# Patient Record
Sex: FEMALE | Race: OTHER | HISPANIC OR LATINO | URBAN - METROPOLITAN AREA
[De-identification: names, ages, dates, MRNs, and addresses within clinical notes are randomized per-mention and may not be internally consistent; named-entity substitution may affect disease eponyms.]

---

## 2019-01-17 ENCOUNTER — EMERGENCY (EMERGENCY)
Facility: HOSPITAL | Age: 29
LOS: 0 days | Discharge: HOME | End: 2019-01-17
Attending: EMERGENCY MEDICINE | Admitting: EMERGENCY MEDICINE

## 2019-01-17 VITALS
SYSTOLIC BLOOD PRESSURE: 137 MMHG | TEMPERATURE: 98 F | DIASTOLIC BLOOD PRESSURE: 73 MMHG | OXYGEN SATURATION: 98 % | HEIGHT: 61 IN | RESPIRATION RATE: 20 BRPM | HEART RATE: 90 BPM | WEIGHT: 125 LBS

## 2019-01-17 DIAGNOSIS — R07.89 OTHER CHEST PAIN: ICD-10-CM

## 2019-01-17 DIAGNOSIS — R07.9 CHEST PAIN, UNSPECIFIED: ICD-10-CM

## 2019-01-17 RX ORDER — IBUPROFEN 200 MG
600 TABLET ORAL ONCE
Qty: 0 | Refills: 0 | Status: COMPLETED | OUTPATIENT
Start: 2019-01-17 | End: 2019-01-17

## 2019-01-17 RX ADMIN — Medication 600 MILLIGRAM(S): at 01:03

## 2019-01-17 NOTE — ED PROVIDER NOTE - OBJECTIVE STATEMENT
pt with anterior cp for 2-3 days, intermittently, but today constant for about 12 hrs  worse when yelling at her children she sts  has not tried anything for pain  similar sxs 1 year ago, seen at ER in [place] and told "not a heart attack", never had cardio f/u  denies recent travel or sick contacts  +CAD in GF late in life, no other cardiac fam hx or sudden death  denies smoking, etoh, drug use  Denies fever/chill/HA/dizziness/palpitation/sob/abd pain/n/v/d/ black stool/bloody stool/urinary sxs/leg pain or swelling

## 2019-01-17 NOTE — ED PROVIDER NOTE - PROGRESS NOTE DETAILS
plan for cxr, NSAID, reassess; hx/exam c/w MS source and d/w pt, she agrees as was told same thing 1 year ago; if wnl, plan for d/c home with er precautions and cardio f/u, pt understands and agrees Pt aware that we will have official radiology read pending, and may result in change of xray read.  Pt voices understanding of use of medications, instructions for care, and reasons to return or to go to ED

## 2019-01-17 NOTE — ED PROVIDER NOTE - PHYSICAL EXAMINATION
CONSTITUTIONAL: Well-appearing; well-nourished; in no apparent distress.   NECK: Supple; non-tender; no cervical lymphadenopathy. No JVD.   CARDIOVASCULAR: Normal S1, S2; no murmurs, rubs, or gallops.   RESPIRATORY: Normal chest excursion with respiration; breath sounds clear and equal bilaterally; no wheezes, rhonchi, or rales.  GI/: Normal bowel sounds; non-distended; non-tender; no palpable organomegaly.   SKIN: Normal for age and race; warm; dry; good turgor; no apparent lesions or exudate.   NEURO/PSYCH: A & O x 4; grossly unremarkable. mood and manner are appropriate. Grooming and personal hygiene are appropriate. No apparent thoughts of harm to self or others.

## 2019-01-17 NOTE — ED PROVIDER NOTE - ATTENDING CONTRIBUTION TO CARE
I personally evaluated the patient. I reviewed the Resident’s or Physician Assistant’s note (as assigned above), and agree with the findings and plan except as documented in my note.  28 yr F with intermittent mid chest pain. No SOB, no traum, no LE pain /swelling, coughing, recent travel/immobilization. On exam, non toxic, talking in fulls sentences. Normal s1s2, lungs ctab. Plan is EKG, CXR and reassess.

## 2019-01-17 NOTE — ED PROVIDER NOTE - CARE PROVIDER_API CALL
Naren Bautista), Cardiovascular Disease; Internal Medicine  [address] [place] [address]  Phone: [phone]  Fax: [phone]

## 2019-01-17 NOTE — ED PROVIDER NOTE - NS ED ROS FT
Constitutional: no fever, chills, no recent weight loss, change in appetite or malaise  Eyes: no redness/discharge/pain/vision changes  ENT: no rhinorrhea/ear pain/sore throat  Cardiac: No SOB or edema.  Respiratory: No cough or respiratory distress  GI: No nausea, vomiting, diarrhea or abdominal pain.  : No dysuria, frequency, urgency or hematuria  MS: no pain to back or extremities, no loss of ROM, no weakness  Neuro: No headache or weakness. No LOC.  Skin: No skin rash.  Endocrine: No history of thyroid disease or diabetes.  Except as documented in the HPI, all other systems are negative.

## 2019-03-16 ENCOUNTER — OUTPATIENT (OUTPATIENT)
Dept: OUTPATIENT SERVICES | Facility: HOSPITAL | Age: 29
LOS: 1 days | Discharge: HOME | End: 2019-03-16

## 2019-03-16 DIAGNOSIS — Z00.00 ENCOUNTER FOR GENERAL ADULT MEDICAL EXAMINATION WITHOUT ABNORMAL FINDINGS: ICD-10-CM

## 2019-11-25 ENCOUNTER — OUTPATIENT (OUTPATIENT)
Dept: OUTPATIENT SERVICES | Facility: HOSPITAL | Age: 29
LOS: 1 days | Discharge: HOME | End: 2019-11-25

## 2019-11-25 DIAGNOSIS — R10.12 LEFT UPPER QUADRANT PAIN: ICD-10-CM

## 2020-11-02 PROBLEM — Z00.00 ENCOUNTER FOR PREVENTIVE HEALTH EXAMINATION: Status: ACTIVE | Noted: 2020-11-02

## 2020-11-06 ENCOUNTER — APPOINTMENT (OUTPATIENT)
Dept: OBGYN | Facility: CLINIC | Age: 30
End: 2020-11-06
Payer: OTHER GOVERNMENT

## 2020-11-06 VITALS
DIASTOLIC BLOOD PRESSURE: 76 MMHG | HEIGHT: 61 IN | SYSTOLIC BLOOD PRESSURE: 111 MMHG | BODY MASS INDEX: 23.41 KG/M2 | WEIGHT: 124 LBS | HEART RATE: 74 BPM | TEMPERATURE: 97.7 F

## 2020-11-06 DIAGNOSIS — Z78.9 OTHER SPECIFIED HEALTH STATUS: ICD-10-CM

## 2020-11-06 DIAGNOSIS — Z80.9 FAMILY HISTORY OF MALIGNANT NEOPLASM, UNSPECIFIED: ICD-10-CM

## 2020-11-06 DIAGNOSIS — R10.9 UNSPECIFIED ABDOMINAL PAIN: ICD-10-CM

## 2020-11-06 DIAGNOSIS — N91.2 AMENORRHEA, UNSPECIFIED: ICD-10-CM

## 2020-11-06 DIAGNOSIS — R10.2 PELVIC AND PERINEAL PAIN: ICD-10-CM

## 2020-11-06 DIAGNOSIS — Z12.4 ENCOUNTER FOR SCREENING FOR MALIGNANT NEOPLASM OF CERVIX: ICD-10-CM

## 2020-11-06 DIAGNOSIS — Z01.419 ENCOUNTER FOR GYNECOLOGICAL EXAMINATION (GENERAL) (ROUTINE) W/OUT ABNORMAL FINDINGS: ICD-10-CM

## 2020-11-06 PROCEDURE — 99385 PREV VISIT NEW AGE 18-39: CPT

## 2020-11-06 PROCEDURE — 81025 URINE PREGNANCY TEST: CPT

## 2020-11-10 LAB
C TRACH RRNA SPEC QL NAA+PROBE: NOT DETECTED
HPV HIGH+LOW RISK DNA PNL CVX: NOT DETECTED
N GONORRHOEA RRNA SPEC QL NAA+PROBE: NOT DETECTED
SOURCE AMPLIFICATION: NORMAL

## 2020-11-11 LAB
SOURCE AMPLIFICATION: NORMAL
T VAGINALIS RRNA SPEC QL NAA+PROBE: NOT DETECTED

## 2020-11-14 PROBLEM — Z80.9 FAMILY HISTORY OF MALIGNANT NEOPLASM: Status: ACTIVE | Noted: 2020-11-14

## 2020-11-14 PROBLEM — R10.9 ABDOMINAL PAIN: Status: ACTIVE | Noted: 2020-11-14

## 2020-11-14 PROBLEM — Z78.9 EXERCISES OCCASIONALLY: Status: ACTIVE | Noted: 2020-11-14

## 2020-11-14 PROBLEM — Z78.9 CONSUMES ALCOHOL OCCASIONALLY: Status: ACTIVE | Noted: 2020-11-14

## 2020-11-14 PROBLEM — R10.2 PELVIC PAIN: Status: ACTIVE | Noted: 2020-11-14

## 2020-11-14 PROBLEM — N91.2 AMENORRHEA: Status: ACTIVE | Noted: 2020-11-14

## 2020-11-14 PROBLEM — Z12.4 ENCOUNTER FOR SCREENING FOR CERVICAL CANCER: Status: ACTIVE | Noted: 2020-11-06

## 2020-11-14 PROBLEM — Z78.9 DOES NOT USE ILLICIT DRUGS: Status: ACTIVE | Noted: 2020-11-14

## 2020-11-14 PROBLEM — Z78.9 DOES NOT USE TOBACCO: Status: ACTIVE | Noted: 2020-11-14

## 2020-11-14 RX ORDER — LEVONORGESTREL 52 MG/1
20 INTRAUTERINE DEVICE INTRAUTERINE
Refills: 0 | Status: ACTIVE | COMMUNITY

## 2020-11-14 RX ORDER — MULTIVITAMIN
TABLET ORAL
Refills: 0 | Status: ACTIVE | COMMUNITY

## 2020-11-18 NOTE — PHYSICAL EXAM
[Appropriately responsive] : appropriately responsive [Alert] : alert [No Acute Distress] : no acute distress [No Lymphadenopathy] : no lymphadenopathy [Soft] : soft [Non-tender] : non-tender [Non-distended] : non-distended [No Mass] : no mass [Oriented x3] : oriented x3 [Examination Of The Breasts] : a normal appearance [No Discharge] : no discharge [No Masses] : no breast masses were palpable [Labia Majora] : normal [Labia Minora] : normal [No Bleeding] : There was no active vaginal bleeding [IUD String] : an IUD string was noted [Normal] : normal [Tenderness] : nontender [Enlarged ___ wks] : not enlarged [Mass ___ cm] : no uterine mass was palpated [Uterine Adnexae] : normal [FreeTextEntry5] : pap smear done [FreeTextEntry6] : no reproducible pain on bimanual exam

## 2020-11-18 NOTE — HISTORY OF PRESENT ILLNESS
[Patient reported PAP Smear was normal] : Patient reported PAP Smear was normal [IUD] : has an intrauterine device [Y] : Positive pregnancy history [Menarche Age: ____] : age at menarche was [unfilled] [Currently Active] : currently active [Men] : men [No] : No [Gonorrhea test offered] : Gonorrhea test offered [Chlamydia test offered] : Chlamydia test offered [Trichomonas test offered] : Trichomonas test offered [HPV test offered] : HPV test offered [FreeTextEntry1] : Pt is here for an initial annual exam, pt has c/o of occasional pelvic pain that is associated with pain in abdomen also. Pt would also like to discuss having a salpingectomy, pt has 2 kids and IUD placed but states that the IUD can be felt during intercourse.  [TextBox_4] : 29yo  with LMP 4yrs ago after Mirena IUD inserted (had some spotting 2/2020), she came for annual GYN Exam and pap smear.  She also c/o occasional pelvic and abdominal pain that has become more frequent.  It is associated with nausea and is worsened with food and alleviated with water intake.  Patient points mostly to mid abdomen.  She also would like to discuss permanent sterilization because she is not satisfied with the Mirena long term mgmt and plans to change mgmt when IUD is removed. [PapSmeardate] : 07/2019 [de-identified] : Mirena [PGHxTotal] : 2 [[facility] : 2 [PGHxPremature] : 0 [PGHxAbortions] : 0 [[facility]Living] : 2 [FreeTextEntry3] : IUD

## 2020-11-18 NOTE — REVIEW OF SYSTEMS
[Negative] : Heme/Lymph [Abdominal Pain] : abdominal pain [Nausea] : nausea [Pelvic pain] : pelvic pain [FreeTextEntry7] : see HPI [FreeTextEntry8] : see HPI

## 2020-11-18 NOTE — COUNSELING
[Nutrition/ Exercise/ Weight Management] : nutrition, exercise, weight management [Vitamins/Supplements] : vitamins/supplements [Breast Self Exam] : breast self exam [Contraception/ Emergency Contraception/ Safe Sexual Practices] : contraception, emergency contraception, safe sexual practices [STD (testing, results, tx)] : STD (testing, results, tx) [Medication Management] : medication management

## 2020-11-18 NOTE — DISCUSSION/SUMMARY
[FreeTextEntry1] : A: 31yo for annual GYN Exam, abd/pelvic pain-intermittent, contraception mgmt\par \par P: GYN Exam done\par     pap smear done\par     safe sex practices\par     contraception counseling done - will keep IUD for now (in place), but signed informed consent for permanent sterilization and will return after 30 days waiting period if wants to continue with mgmt\par     referral for abd/pelvis sono\par     pain and bleeding precautions\par     f/u after imaging or PRN

## 2020-12-23 PROBLEM — Z01.419 ENCOUNTER FOR ANNUAL ROUTINE GYNECOLOGICAL EXAMINATION: Status: RESOLVED | Noted: 2020-11-14 | Resolved: 2020-12-23

## 2021-03-18 ENCOUNTER — NON-APPOINTMENT (OUTPATIENT)
Age: 31
End: 2021-03-18

## 2021-04-07 ENCOUNTER — APPOINTMENT (OUTPATIENT)
Dept: OBGYN | Facility: CLINIC | Age: 31
End: 2021-04-07
Payer: OTHER GOVERNMENT

## 2021-04-07 DIAGNOSIS — Z30.09 ENCOUNTER FOR OTHER GENERAL COUNSELING AND ADVICE ON CONTRACEPTION: ICD-10-CM

## 2021-04-07 DIAGNOSIS — Z30.2 ENCOUNTER FOR STERILIZATION: ICD-10-CM

## 2021-04-07 PROCEDURE — 99443: CPT

## 2021-04-12 ENCOUNTER — NON-APPOINTMENT (OUTPATIENT)
Age: 31
End: 2021-04-12

## 2021-04-26 ENCOUNTER — LABORATORY RESULT (OUTPATIENT)
Age: 31
End: 2021-04-26

## 2021-04-26 ENCOUNTER — OUTPATIENT (OUTPATIENT)
Dept: OUTPATIENT SERVICES | Facility: HOSPITAL | Age: 31
LOS: 1 days | Discharge: HOME | End: 2021-04-26

## 2021-04-26 DIAGNOSIS — Z11.59 ENCOUNTER FOR SCREENING FOR OTHER VIRAL DISEASES: ICD-10-CM

## 2021-04-28 PROBLEM — Z30.2 REQUEST FOR STERILIZATION: Status: ACTIVE | Noted: 2021-04-28

## 2021-04-28 PROBLEM — Z30.09 ENCOUNTER FOR STERILIZATION EDUCATION: Status: ACTIVE | Noted: 2021-04-28

## 2021-04-28 PROBLEM — Z30.09 GENERAL COUNSELING AND ADVICE FOR CONTRACEPTIVE MANAGEMENT: Status: ACTIVE | Noted: 2020-11-14

## 2021-04-29 ENCOUNTER — OUTPATIENT (OUTPATIENT)
Dept: OUTPATIENT SERVICES | Facility: HOSPITAL | Age: 31
LOS: 1 days | Discharge: HOME | End: 2021-04-29
Payer: OTHER GOVERNMENT

## 2021-04-29 ENCOUNTER — RESULT REVIEW (OUTPATIENT)
Age: 31
End: 2021-04-29

## 2021-04-29 VITALS
DIASTOLIC BLOOD PRESSURE: 59 MMHG | SYSTOLIC BLOOD PRESSURE: 96 MMHG | RESPIRATION RATE: 18 BRPM | OXYGEN SATURATION: 97 % | HEART RATE: 89 BPM

## 2021-04-29 VITALS
TEMPERATURE: 99 F | SYSTOLIC BLOOD PRESSURE: 104 MMHG | HEIGHT: 61 IN | HEART RATE: 71 BPM | RESPIRATION RATE: 18 BRPM | WEIGHT: 125 LBS | DIASTOLIC BLOOD PRESSURE: 76 MMHG | OXYGEN SATURATION: 100 %

## 2021-04-29 PROCEDURE — 58661 LAPAROSCOPY REMOVE ADNEXA: CPT

## 2021-04-29 PROCEDURE — 58301 REMOVE INTRAUTERINE DEVICE: CPT

## 2021-04-29 PROCEDURE — 88302 TISSUE EXAM BY PATHOLOGIST: CPT | Mod: 26

## 2021-04-29 PROCEDURE — ZZZZZ: CPT

## 2021-04-29 RX ORDER — OXYCODONE AND ACETAMINOPHEN 5; 325 MG/1; MG/1
1 TABLET ORAL
Qty: 9 | Refills: 0
Start: 2021-04-29 | End: 2021-05-01

## 2021-04-29 RX ORDER — SIMETHICONE 80 MG/1
1 TABLET, CHEWABLE ORAL
Qty: 28 | Refills: 0
Start: 2021-04-29 | End: 2021-05-05

## 2021-04-29 RX ORDER — DOCUSATE SODIUM 100 MG
1 CAPSULE ORAL
Qty: 14 | Refills: 0
Start: 2021-04-29 | End: 2021-05-05

## 2021-04-29 RX ORDER — HYDROMORPHONE HYDROCHLORIDE 2 MG/ML
0.5 INJECTION INTRAMUSCULAR; INTRAVENOUS; SUBCUTANEOUS ONCE
Refills: 0 | Status: DISCONTINUED | OUTPATIENT
Start: 2021-04-29 | End: 2021-04-29

## 2021-04-29 RX ORDER — HYDROMORPHONE HYDROCHLORIDE 2 MG/ML
1 INJECTION INTRAMUSCULAR; INTRAVENOUS; SUBCUTANEOUS ONCE
Refills: 0 | Status: DISCONTINUED | OUTPATIENT
Start: 2021-04-29 | End: 2021-04-29

## 2021-04-29 RX ORDER — SODIUM CHLORIDE 9 MG/ML
1000 INJECTION, SOLUTION INTRAVENOUS
Refills: 0 | Status: DISCONTINUED | OUTPATIENT
Start: 2021-04-29 | End: 2021-05-13

## 2021-04-29 RX ORDER — ONDANSETRON 8 MG/1
4 TABLET, FILM COATED ORAL ONCE
Refills: 0 | Status: DISCONTINUED | OUTPATIENT
Start: 2021-04-29 | End: 2021-05-13

## 2021-04-29 RX ADMIN — SODIUM CHLORIDE 100 MILLILITER(S): 9 INJECTION, SOLUTION INTRAVENOUS at 15:00

## 2021-04-29 RX ADMIN — SODIUM CHLORIDE 100 MILLILITER(S): 9 INJECTION, SOLUTION INTRAVENOUS at 14:27

## 2021-04-29 RX ADMIN — SODIUM CHLORIDE 100 MILLILITER(S): 9 INJECTION, SOLUTION INTRAVENOUS at 15:01

## 2021-04-29 NOTE — BRIEF OPERATIVE NOTE - NSICDXBRIEFPREOP_GEN_ALL_CORE_FT
PRE-OP DIAGNOSIS:  Encounter for sterilization 29-Apr-2021 13:52:11  Yolette Cedeño  Multiparity 29-Apr-2021 13:52:03  Yolette Cedeño

## 2021-04-29 NOTE — ASU DISCHARGE PLAN (ADULT/PEDIATRIC) - ASU DC SPECIAL INSTRUCTIONSFT
Nothing in the vagina for 2 weeks (no sex, no tampons, no douching). Avoid tub baths, you may shower.    If you have a fever of 100.4F or greater, severe vaginal bleeding, or severe abdominal pain, call your Ob/Gyn or come to the emergency department immediately.    Follow up with provider in 2 weeks.    Medications:   Take Motrin 600 mg by mouth, every 6 hours, as needed for pain  Colace 100mg twice a day for 7 days  Percocet for severe pain   simethicone for gas

## 2021-04-29 NOTE — BRIEF OPERATIVE NOTE - OPERATION/FINDINGS
Vagina and cervix appeared normal. IUD string not visualized, polyp forceps inserted into cervical canal, IUD strings grabbed, IUD removed without resistance  Laparoscopy shoed: normal uterus, adhesion of left fallopian tube with omentum seen, 1cm paratubal cyst. Right fallopian tube appeared normal. Right ovarian cysts visualized. Abdminal cavity survey revealed normal liver, gall bladder and appendix. Vagina and cervix appeared normal. IUD string not visualized, polyp forceps inserted into cervical canal, IUD strings grabbed, IUD removed without resistance  Laparoscopy showed: normal uterus, adhesion of left fallopian tube with omentum seen, 1cm paratubal cyst. Right fallopian tube appeared normal. Right ovarian cyst/corpus visualized. Abdominal cavity survey revealed normal liver, gall bladder and appendix. Right large bowel adhesion to side wall

## 2021-04-29 NOTE — BRIEF OPERATIVE NOTE - NSICDXBRIEFPROCEDURE_GEN_ALL_CORE_FT
PROCEDURES:  Laparoscopic salpingotomy 29-Apr-2021 13:51:41 bilateral Yolette Cedeño  Removal of Mirena IUD 29-Apr-2021 13:52:33  Yolette Cedeño

## 2021-04-29 NOTE — ASU DISCHARGE PLAN (ADULT/PEDIATRIC) - CARE PROVIDER_API CALL
Lorin Coleman)  Obstetrics and Gynecology  [facility], [address]  [place] [address]  Phone: [phone]  Fax: ([phone]  Follow Up Time: 2 weeks

## 2021-04-29 NOTE — BRIEF OPERATIVE NOTE - NSICDXBRIEFPOSTOP_GEN_ALL_CORE_FT
POST-OP DIAGNOSIS:  Multiparity 29-Apr-2021 13:52:19  Yolette Cedeño  Encounter for sterilization 29-Apr-2021 13:52:17  Yolette Cedeño

## 2021-04-29 NOTE — ASU DISCHARGE PLAN (ADULT/PEDIATRIC) - FOLLOW UP APPOINTMENTS
[facility],  Endoscopy/Ambulatory Surgery [facility]:  [facility]

## 2021-04-29 NOTE — H&P PST ADULT - HISTORY OF PRESENT ILLNESS
29yo  came for scheduled elective laparoscopic bilateral salpingectomy for permanent sterilization and IUD removal.  She has Mirena IUD insitu for 4+years.  She previously signed sterilization consent in office >30days ago.  She has been counseled on all forms of contraception and still chooses permanent sterilization  Patient has been counseled that there is no possibility of reversal.  If in the future she regrets her decision, pregnancy could be attempted via IVF, but usually not covered by insurance.  She understands and wishes to proceed.  She denies CP, SOB, N/V, pelvic pain or other GYN complaints  She has no significant comorbidities or surgical history.    We discussed the R/B/A of surgery, including but not limited to, pain, hemorrhage, blood transfusion, infection, injury to bowel/bladder/organs with repair, need for laparotomy, need for reoperation or readmission.  Patient understands all risks, all questions answered satisfactorily and patient signed informed witnessed consent as well as reaffirmed previous sterilization consent.

## 2021-04-29 NOTE — ASU DISCHARGE PLAN (ADULT/PEDIATRIC) - PROVIDER TOKENS
Per Dr. Ramsey \" please find out if patient is agreeable to starting the injectable form. Cypoinate 100 mg every 3 weeks and recheck after 4 doses.     Unable to reach patient or leave a message.        PROVIDER:[TOKEN:[[id number]],FOLLOWUP:[2 weeks]]

## 2021-05-03 LAB — SURGICAL PATHOLOGY STUDY: SIGNIFICANT CHANGE UP

## 2021-05-04 LAB
CULTURE RESULTS: SIGNIFICANT CHANGE UP
SPECIMEN SOURCE: SIGNIFICANT CHANGE UP

## 2021-05-07 DIAGNOSIS — Z30.432 ENCOUNTER FOR REMOVAL OF INTRAUTERINE CONTRACEPTIVE DEVICE: ICD-10-CM

## 2021-05-07 DIAGNOSIS — N83.8 OTHER NONINFLAMMATORY DISORDERS OF OVARY, FALLOPIAN TUBE AND BROAD LIGAMENT: ICD-10-CM

## 2021-05-07 DIAGNOSIS — Z30.2 ENCOUNTER FOR STERILIZATION: ICD-10-CM

## 2021-05-07 DIAGNOSIS — N73.6 FEMALE PELVIC PERITONEAL ADHESIONS (POSTINFECTIVE): ICD-10-CM

## 2021-05-14 ENCOUNTER — APPOINTMENT (OUTPATIENT)
Dept: OBGYN | Facility: CLINIC | Age: 31
End: 2021-05-14
Payer: OTHER GOVERNMENT

## 2021-05-14 VITALS
TEMPERATURE: 97.7 F | DIASTOLIC BLOOD PRESSURE: 67 MMHG | BODY MASS INDEX: 24.17 KG/M2 | HEIGHT: 61 IN | HEART RATE: 73 BPM | SYSTOLIC BLOOD PRESSURE: 112 MMHG | WEIGHT: 128 LBS

## 2021-05-14 DIAGNOSIS — Z71.89 OTHER SPECIFIED COUNSELING: ICD-10-CM

## 2021-05-14 DIAGNOSIS — Z09 ENCOUNTER FOR FOLLOW-UP EXAMINATION AFTER COMPLETED TREATMENT FOR CONDITIONS OTHER THAN MALIGNANT NEOPLASM: ICD-10-CM

## 2021-05-14 DIAGNOSIS — Z90.79 ACQUIRED ABSENCE OF OTHER GENITAL ORGAN(S): ICD-10-CM

## 2021-05-14 DIAGNOSIS — Z97.5 PRESENCE OF (INTRAUTERINE) CONTRACEPTIVE DEVICE: ICD-10-CM

## 2021-05-14 PROCEDURE — 99024 POSTOP FOLLOW-UP VISIT: CPT

## 2021-05-16 PROBLEM — Z90.79 STATUS POST BILATERAL SALPINGECTOMY: Status: ACTIVE | Noted: 2021-05-16

## 2021-05-16 PROBLEM — Z71.89 OTHER SPECIFIED COUNSELING: Status: ACTIVE | Noted: 2020-11-14

## 2021-05-16 PROBLEM — Z09 POSTOPERATIVE EXAMINATION: Status: ACTIVE | Noted: 2021-05-16

## 2021-05-16 PROBLEM — Z97.5 IUD (INTRAUTERINE DEVICE) IN PLACE: Status: RESOLVED | Noted: 2020-11-14 | Resolved: 2021-05-16

## 2021-05-16 NOTE — PHYSICAL EXAM
[Appropriately responsive] : appropriately responsive [Alert] : alert [No Acute Distress] : no acute distress [Regular Rate Rhythm] : regular rate rhythm [Soft] : soft [Non-tender] : non-tender [Non-distended] : non-distended [No Mass] : no mass [FreeTextEntry7] : dermabond removed on all 3 incisions, wounds cleaned, all closed, no erythema, no induration, no evidence of infection, healing well

## 2021-05-16 NOTE — DISCUSSION/SUMMARY
[FreeTextEntry1] : A: 31yo for postop check\par \par P: post op exam done- healing well\par     incision cleaned and closed\par     pain, bleeding and infection precautions\par     menstrual diary\par     safe sex practices\par     encourage healthy diet  and exercise as tolerated\par     f/u for routine annual GYN exam or PRN

## 2021-05-16 NOTE — COUNSELING
[Nutrition/ Exercise/ Weight Management] : nutrition, exercise, weight management [Vitamins/Supplements] : vitamins/supplements [Breast Self Exam] : breast self exam [Bladder Hygiene] : bladder hygiene [Contraception/ Emergency Contraception/ Safe Sexual Practices] : contraception, emergency contraception, safe sexual practices [STD (testing, results, tx)] : STD (testing, results, tx)

## 2021-05-16 NOTE — HISTORY OF PRESENT ILLNESS
[Menarche Age: ____] : age at menarche was [unfilled] [Currently Active] : currently active [Men] : men [Patient reported PAP Smear was normal] : Patient reported PAP Smear was normal [No] : No [FreeTextEntry1] : Pt is here for her post-op visit after having a bilateral salpingectomy on 4/29/21 for permanent sterilization. Pt has no complaints today.  [TextBox_4] : 31yo P2 came for post-op check after laparoscopic b/l salpingectomy for permanent sterilization and IUD removal.  She denies AUB, pelvic pain, discharge, fever/chills or other complaints.  She states normal bowel and bladder function. Pathology report reviewed and discussed, benign tubes bilaterally and benign paratubal cyst. [PapSmeardate] : 11/2020